# Patient Record
Sex: FEMALE | Race: BLACK OR AFRICAN AMERICAN | NOT HISPANIC OR LATINO | URBAN - METROPOLITAN AREA
[De-identification: names, ages, dates, MRNs, and addresses within clinical notes are randomized per-mention and may not be internally consistent; named-entity substitution may affect disease eponyms.]

---

## 2018-06-06 ENCOUNTER — EMERGENCY (EMERGENCY)
Facility: HOSPITAL | Age: 53
LOS: 1 days | Discharge: HOME | End: 2018-06-06
Attending: EMERGENCY MEDICINE
Payer: COMMERCIAL

## 2018-06-06 VITALS
HEART RATE: 86 BPM | TEMPERATURE: 98 F | SYSTOLIC BLOOD PRESSURE: 143 MMHG | RESPIRATION RATE: 18 BRPM | DIASTOLIC BLOOD PRESSURE: 87 MMHG | OXYGEN SATURATION: 100 %

## 2018-06-06 PROCEDURE — 99283 EMERGENCY DEPT VISIT LOW MDM: CPT | Mod: 25

## 2018-06-07 VITALS
SYSTOLIC BLOOD PRESSURE: 147 MMHG | OXYGEN SATURATION: 99 % | DIASTOLIC BLOOD PRESSURE: 86 MMHG | TEMPERATURE: 98 F | RESPIRATION RATE: 20 BRPM | HEART RATE: 79 BPM

## 2018-06-07 LAB
APPEARANCE UR: CLEAR — SIGNIFICANT CHANGE UP
BILIRUB UR-MCNC: NEGATIVE — SIGNIFICANT CHANGE UP
COLOR SPEC: YELLOW — SIGNIFICANT CHANGE UP
DIFF PNL FLD: NEGATIVE — SIGNIFICANT CHANGE UP
GLUCOSE UR QL: NEGATIVE — SIGNIFICANT CHANGE UP
KETONES UR-MCNC: NEGATIVE — SIGNIFICANT CHANGE UP
LEUKOCYTE ESTERASE UR-ACNC: NEGATIVE — SIGNIFICANT CHANGE UP
NITRITE UR-MCNC: NEGATIVE — SIGNIFICANT CHANGE UP
PH UR: 7.5 — SIGNIFICANT CHANGE UP (ref 5–8)
PROT UR-MCNC: SIGNIFICANT CHANGE UP
SP GR SPEC: 1.01 — SIGNIFICANT CHANGE UP (ref 1.01–1.02)
UROBILINOGEN FLD QL: NEGATIVE — SIGNIFICANT CHANGE UP

## 2018-06-07 PROCEDURE — 87086 URINE CULTURE/COLONY COUNT: CPT

## 2018-06-07 PROCEDURE — 81003 URINALYSIS AUTO W/O SCOPE: CPT

## 2018-06-07 PROCEDURE — 99284 EMERGENCY DEPT VISIT MOD MDM: CPT

## 2018-06-07 RX ORDER — ACETAMINOPHEN 500 MG
975 TABLET ORAL ONCE
Qty: 0 | Refills: 0 | Status: COMPLETED | OUTPATIENT
Start: 2018-06-07 | End: 2018-06-07

## 2018-06-07 RX ORDER — IBUPROFEN 200 MG
600 TABLET ORAL ONCE
Qty: 0 | Refills: 0 | Status: COMPLETED | OUTPATIENT
Start: 2018-06-07 | End: 2018-06-07

## 2018-06-07 RX ORDER — IBUPROFEN 200 MG
1 TABLET ORAL
Qty: 21 | Refills: 0 | OUTPATIENT
Start: 2018-06-07 | End: 2018-06-13

## 2018-06-07 RX ORDER — LIDOCAINE 4 G/100G
1 CREAM TOPICAL ONCE
Qty: 0 | Refills: 0 | Status: COMPLETED | OUTPATIENT
Start: 2018-06-07 | End: 2018-06-07

## 2018-06-07 RX ORDER — LIDOCAINE 4 G/100G
1 CREAM TOPICAL
Qty: 5 | Refills: 0 | OUTPATIENT
Start: 2018-06-07 | End: 2018-06-11

## 2018-06-07 RX ADMIN — Medication 600 MILLIGRAM(S): at 01:05

## 2018-06-07 RX ADMIN — Medication 975 MILLIGRAM(S): at 01:55

## 2018-06-07 RX ADMIN — LIDOCAINE 1 PATCH: 4 CREAM TOPICAL at 01:04

## 2018-06-07 RX ADMIN — Medication 600 MILLIGRAM(S): at 01:55

## 2018-06-07 RX ADMIN — Medication 50 MILLIGRAM(S): at 01:04

## 2018-06-07 RX ADMIN — Medication 975 MILLIGRAM(S): at 01:04

## 2018-06-07 NOTE — ED PROVIDER NOTE - PROGRESS NOTE DETAILS
Pain improved after meds, neuro exam still unremarkable.  Has steady gait.  Provided pt c strict return precautions and spine center f/u.  --M

## 2018-06-07 NOTE — ED ADULT NURSE NOTE - OBJECTIVE STATEMENT
pt states, "I have been having lower back pain for the past x2 weeks. tonight the back pain got worse and goes to my bottom." pt denies any fall/injury, numbness/tingling, chest pain, SOB, n/v/d, abd. pain, or any urinary symptoms at present. pt has full ROM and able to walk with steady gait.

## 2018-06-07 NOTE — ED PROVIDER NOTE - MEDICAL DECISION MAKING DETAILS
Lumbago c intermittent radiation to L posterior thigh.  10 days.  Subacute onset.  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  No night pains, weight loss, f/c, h/o CA or immunosuppression, rash, bowel/bladder function changes, saddle anesthesia, IVDA.  Pain is L sided, radiation worse when she bends.  Not present at rest.  No weakness or sensory changes.  No trauma.  Motrin helps pain.  review of systems -- LBP Otherwise negative except as per HPI.   PE -- GENERAL: AAOx4, GCS 15, Mild distress, WDWN; HEENT: MMM, no jugular venous distension, supple neck, PERRLA, EOMI, nonicteric sclera; PULM: CTA B, no crackles/rubs/rales; CV: RRR, S1S2, no MRG; ABD: Flat abdomen, NTND, no R/G/R, no CVAT.  MSK: Mild TTP to L paralumbar/SI joint.  No TTP at sciatic notch.  No midline TTP.  No stepoff.  full range of motion c flex and ext.  FELIZ, +2 pulses x4;  NEURO: SLR negative b/l.  L side hip flex reproduces LBP.  5/5 motor strength c all muscle groups b/l.  No sensory deficits.  Normal gait ; PSYCH: AAOx3, clear thought and normal sensorium.   A/P -- 10 days LBP c subjective sciatica.  No "red flags" on exam or hx.  Does not require imaging at this time as pain is primarily QL/SI.  No neuro deficits.  Steady gait.  Should do well c OTC pain Rx and lidoderm.  Reassess.  --BMM Lumbago c intermittent radiation to L posterior thigh.  10 days.  Subacute onset.  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  No night pains, weight loss, f/c, h/o CA or immunosuppression, rash, bowel/bladder function changes, saddle anesthesia, IVDA.  Pain is L sided, radiation worse when she bends.  Not present at rest.  No weakness or sensory changes.  No trauma.  Motrin helps pain.  PMH -- None / PSH -- None  review of systems -- LBP Otherwise negative except as per HPI.   PE -- GENERAL: AAOx4, GCS 15, Mild distress, WDWN; HEENT: MMM, no jugular venous distension, supple neck, PERRLA, EOMI, nonicteric sclera; PULM: CTA B, no crackles/rubs/rales; CV: RRR, S1S2, no MRG; ABD: Flat abdomen, NTND, no R/G/R, no CVAT.  MSK: Mild TTP to L paralumbar/SI joint.  No TTP at sciatic notch.  No midline TTP.  No stepoff.  full range of motion c flex and ext.  FELIZ, +2 pulses x4;  NEURO: SLR negative b/l.  L side hip flex reproduces LBP.  5/5 motor strength c all muscle groups b/l.  No sensory deficits.  Normal gait ; PSYCH: AAOx3, clear thought and normal sensorium.   A/P -- 10 days LBP c subjective sciatica.  No "red flags" on exam or hx.  Does not require imaging at this time as pain is primarily QL/SI.  No neuro deficits.  Steady gait.  Should do well c OTC pain Rx and lidoderm.  Reassess.  --BMM

## 2018-06-07 NOTE — ED ADULT NURSE NOTE - CHPI ED SYMPTOMS NEG
no weakness/no decreased eating/drinking/no vomiting/no nausea/no tingling/no numbness/no dizziness/no chills

## 2018-06-08 LAB
CULTURE RESULTS: SIGNIFICANT CHANGE UP
SPECIMEN SOURCE: SIGNIFICANT CHANGE UP

## 2019-12-27 ENCOUNTER — OUTPATIENT (OUTPATIENT)
Dept: OUTPATIENT SERVICES | Facility: HOSPITAL | Age: 54
LOS: 1 days | End: 2019-12-27
Payer: COMMERCIAL

## 2019-12-27 ENCOUNTER — APPOINTMENT (OUTPATIENT)
Dept: MRI IMAGING | Facility: CLINIC | Age: 54
End: 2019-12-27
Payer: COMMERCIAL

## 2019-12-27 DIAGNOSIS — Z00.8 ENCOUNTER FOR OTHER GENERAL EXAMINATION: ICD-10-CM

## 2019-12-27 PROBLEM — Z00.00 ENCOUNTER FOR PREVENTIVE HEALTH EXAMINATION: Status: ACTIVE | Noted: 2019-12-27

## 2019-12-27 PROCEDURE — A9585: CPT

## 2019-12-27 PROCEDURE — 70553 MRI BRAIN STEM W/O & W/DYE: CPT

## 2019-12-27 PROCEDURE — 70553 MRI BRAIN STEM W/O & W/DYE: CPT | Mod: 26

## 2022-01-24 ENCOUNTER — EMERGENCY (EMERGENCY)
Facility: HOSPITAL | Age: 57
LOS: 1 days | Discharge: HOME | End: 2022-01-24
Attending: CLINIC/CENTER
Payer: SELF-PAY

## 2022-01-24 VITALS
HEART RATE: 61 BPM | DIASTOLIC BLOOD PRESSURE: 73 MMHG | HEIGHT: 63 IN | SYSTOLIC BLOOD PRESSURE: 157 MMHG | OXYGEN SATURATION: 98 % | WEIGHT: 162.92 LBS | TEMPERATURE: 98 F | RESPIRATION RATE: 18 BRPM

## 2022-01-24 PROCEDURE — 70450 CT HEAD/BRAIN W/O DYE: CPT | Mod: MA

## 2022-01-24 PROCEDURE — 99284 EMERGENCY DEPT VISIT MOD MDM: CPT | Mod: 25

## 2022-01-24 PROCEDURE — 99284 EMERGENCY DEPT VISIT MOD MDM: CPT

## 2022-01-24 PROCEDURE — 70450 CT HEAD/BRAIN W/O DYE: CPT | Mod: 26,MA

## 2022-01-24 RX ORDER — ACETAMINOPHEN 500 MG
975 TABLET ORAL ONCE
Refills: 0 | Status: COMPLETED | OUTPATIENT
Start: 2022-01-24 | End: 2022-01-24

## 2022-01-24 NOTE — ED ADULT TRIAGE NOTE - CHIEF COMPLAINT QUOTE
HA and dizziness s/p hitting head on metal plate today. Denies LOC, N/V/D, blurred vision, trouble walking. denies taking blood thinners.

## 2022-01-24 NOTE — ED PROVIDER NOTE - RAPID ASSESSMENT
56 F presents to the ED s/p head trauma after hitting top of head on a metal plate at 1540 today. Endorses HA and dizziness. Denies blurred vision, LOC, n/v, difficulty ambulating, or anticoagulant medication. Has not taken pain medication.    Moise ARNETT (Pina) have documented this rapid assessment note under the dictation of Naomy Canada (PA) which has been reviewed and affirmed to be accurate. Patient was seen as a QPA patient. The patient will be seen and further worked up in the main emergency department and their care will be completed by the main emergency department team along with a thorough physical exam. Receiving team will follow up on labs, analgesia, any clinical imaging, reassess and disposition as clinically indicated, all decisions regarding the progression of care will be made at their discretion. 56 F presents to the ED s/p head trauma after hitting top of head on a metal plate at 1540 today while at work. Endorses HA and dizziness. Denies blurred vision, LOC, n/v, difficulty ambulating, or anticoagulant medication. Has not taken pain medication.    Moise ARNETT (Scribe) have documented this rapid assessment note under the dictation of Naomy Canada (PA) which has been reviewed and affirmed to be accurate. Patient was seen as a QPA patient. The patient will be seen and further worked up in the main emergency department and their care will be completed by the main emergency department team along with a thorough physical exam. Receiving team will follow up on labs, analgesia, any clinical imaging, reassess and disposition as clinically indicated, all decisions regarding the progression of care will be made at their discretion.    Naomy Canada PA-C: The scribe's documentation has been prepared under my direction and personally reviewed by me in its entirety. I confirm that the note above accurately reflects history obtained.   Patient was rapidly assessed via telemedicine encounter; a limited history was obtained. The patient will be seen and further examined and worked up in the main ED and their care will be completed by the main ED team. Receiving team will follow up on labs, analgesia, any clinical imaging, and perform reassessment and disposition of the patient as clinically indicated. All decisions regarding the progression of care will be made at their discretion.

## 2022-01-24 NOTE — ED PROVIDER NOTE - OBJECTIVE STATEMENT
no pmh, p.w lightheadedness after hitting her head 2 hrs ago on a metal plate. no syncope. n/v, vision changes, numbness/tingling. symptom improved since the injury. does not want pain meds.

## 2022-01-24 NOTE — ED PROVIDER NOTE - PHYSICAL EXAMINATION
Neuro: facial symmetry, CN2-12 intact, AOX3, EOMI. PERRLA, 5/5 strength in UE and LE b/l.  Sensation intact in UE/LE b/l. Neg for pronator drift, normal finger to nose, romberg, and normal gait

## 2022-01-24 NOTE — ED PROVIDER NOTE - CLINICAL SUMMARY MEDICAL DECISION MAKING FREE TEXT BOX
low mechanism injury. no gross deformity no ac meds, no syncope low suspicion for brain bleed. neg neuro exam. likely concussion.

## 2022-01-24 NOTE — ED ADULT NURSE REASSESSMENT NOTE - NS ED NURSE REASSESS COMMENT FT1
Patient d/c. Reviewed d/c paperwork with patients, all questions answered at this time. Patient verbalizes understanding. Patient instructed to return to the ER for any worsening s/s including chest pain, SOB, fever, n/v/d. Patient alert and stable at time of d/c. Patient will follow up with her PCP.

## 2022-01-24 NOTE — ED ADULT NURSE REASSESSMENT NOTE - NS ED NURSE REASSESS COMMENT FT1
Patient refused Discharge at this time. MD Flores at the bedside. Patient requesting a head CT at this time. Patient endorses "double vision".

## 2022-01-24 NOTE — ED PROVIDER NOTE - NSFOLLOWUPINSTRUCTIONS_ED_ALL_ED_FT
Thank you for visiting our Emergency Department, it has been a pleasure taking part in your healthcare. Please follow up with your primary doctor within x48 hours.  please take over the counter pain medication such as motrin (600mg every 6hours) and tylenol (650mg every 4hours) for pain and follow up with your primary care doctor.   use ice on the area of most pain.     Return precautions to the Emergency Department include but are not limited to: unrelenting nausea, vomiting, fever, chills, chest pain, shortness of breath, dizziness, abdominal pain, worsening pain, syncope, blood in urine or stool, headache that doesn't resolve, numbness or tingling, loss of sensation, loss of motor function, or any other concerning symptoms.

## 2022-01-24 NOTE — ED PROVIDER NOTE - PATIENT PORTAL LINK FT
You can access the FollowMyHealth Patient Portal offered by [facility] by registering at the following website: [email or web address]. By joining [employer]’s FollowMyHealth portal, you will also be able to view your health information using other applications (apps) compatible with our system. You can access the FollowMyHealth Patient Portal offered by [facility] by registering at the following website: [email or web address]. By joining [employer]’s FollowMyHealth portal, you will also be able to view your health information using other applications (apps) compatible with our system.

## 2022-01-24 NOTE — ED PROVIDER NOTE - NS ED ROS FT
GENERAL: No fever or chills, weight changes, nightsweats  EYES: Diplopia   HEENT: no dysplasia, odynophagia, ear pain, rhinorrhea, epistasis   CARDIAC: no chest pain, palpitation   PULMONARY: no productive cough or SOB  GI: no abdominal pain, no nausea or no vomiting, no diarrhea or constipation  : No changes in urination for pain/freq.   SKIN: no rashes, abnormal bruising or bleeding  NEURO: + headache, no numbness/tingling, extremity weakness   MSK: No joint pain

## 2022-01-24 NOTE — ED ADULT NURSE NOTE - OBJECTIVE STATEMENT
55 y/o female coming to the ER with c/o headache. A&Ox4. Ambulatory. No significant PMH. Patient reports prior to coming in she hit her head on "something metal". Patient states she hit the top of her head, negative LOC, no anticoagulants. Patient endorses she had a headache immediately after. PERRL. Patient moving all extremities. Equal strength and sensation in b/l extremities. Patient denies chest pain, SOB, blurry vision, numbness/tingling, fever, chills, n/v/d, urinary symptoms, abdominal pain. Safety measures maintained. Bed in the lowest position. Provider at the bedside. No acute distress noted or further complaints at this time.

## 2022-09-19 NOTE — ED ADULT TRIAGE NOTE - CCCP TRG CHIEF CMPLNT
Outgoing call to patient for [employer] PAP. Patients stated she has not had time to work on application.   headache